# Patient Record
Sex: FEMALE | Race: WHITE | HISPANIC OR LATINO | Employment: PART TIME | URBAN - METROPOLITAN AREA
[De-identification: names, ages, dates, MRNs, and addresses within clinical notes are randomized per-mention and may not be internally consistent; named-entity substitution may affect disease eponyms.]

---

## 2023-04-14 PROBLEM — B96.89 ACUTE BACTERIAL SINUSITIS: Status: ACTIVE | Noted: 2023-04-14

## 2023-04-14 PROBLEM — J01.90 ACUTE BACTERIAL SINUSITIS: Status: ACTIVE | Noted: 2023-04-14

## 2023-04-14 PROBLEM — Z13.31 POSITIVE SCREENING FOR DEPRESSION ON 9-ITEM PATIENT HEALTH QUESTIONNAIRE (PHQ-9): Status: ACTIVE | Noted: 2023-04-14

## 2023-05-03 ENCOUNTER — OFFICE VISIT (OUTPATIENT)
Dept: FAMILY MEDICINE CLINIC | Facility: CLINIC | Age: 48
End: 2023-05-03

## 2023-05-03 VITALS
SYSTOLIC BLOOD PRESSURE: 128 MMHG | OXYGEN SATURATION: 96 % | RESPIRATION RATE: 18 BRPM | TEMPERATURE: 98 F | DIASTOLIC BLOOD PRESSURE: 84 MMHG | WEIGHT: 170 LBS | HEART RATE: 74 BPM | BODY MASS INDEX: 29.02 KG/M2 | HEIGHT: 64 IN

## 2023-05-03 DIAGNOSIS — Z12.11 COLON CANCER SCREENING: ICD-10-CM

## 2023-05-03 DIAGNOSIS — M94.0 COSTOCHONDRITIS: ICD-10-CM

## 2023-05-03 DIAGNOSIS — Z12.4 CERVICAL CANCER SCREENING: ICD-10-CM

## 2023-05-03 DIAGNOSIS — E66.3 OVERWEIGHT (BMI 25.0-29.9): ICD-10-CM

## 2023-05-03 DIAGNOSIS — J32.1 CHRONIC FRONTAL SINUSITIS: ICD-10-CM

## 2023-05-03 DIAGNOSIS — R00.2 PALPITATIONS: ICD-10-CM

## 2023-05-03 DIAGNOSIS — Z12.31 ENCOUNTER FOR SCREENING MAMMOGRAM FOR MALIGNANT NEOPLASM OF BREAST: ICD-10-CM

## 2023-05-03 DIAGNOSIS — R10.2 PELVIC PAIN: ICD-10-CM

## 2023-05-03 DIAGNOSIS — Z23 ENCOUNTER FOR IMMUNIZATION: ICD-10-CM

## 2023-05-03 DIAGNOSIS — Z11.3 SCREENING EXAMINATION FOR STD (SEXUALLY TRANSMITTED DISEASE): ICD-10-CM

## 2023-05-03 DIAGNOSIS — E55.9 VITAMIN D DEFICIENCY: ICD-10-CM

## 2023-05-03 DIAGNOSIS — F99 MENTAL HEALTH DISORDER: Primary | ICD-10-CM

## 2023-05-03 PROBLEM — J32.9 CHRONIC SINUSITIS: Status: ACTIVE | Noted: 2023-04-14

## 2023-05-03 PROBLEM — Z13.31 POSITIVE SCREENING FOR DEPRESSION ON 9-ITEM PATIENT HEALTH QUESTIONNAIRE (PHQ-9): Status: RESOLVED | Noted: 2023-04-14 | Resolved: 2023-05-03

## 2023-05-03 NOTE — ASSESSMENT & PLAN NOTE
BMI Counseling: Body mass index is 29 18 kg/m²  The BMI is above normal  Nutrition recommendations include reducing portion sizes, decreasing overall calorie intake, 3-5 servings of fruits/vegetables daily, reducing fast food intake, consuming healthier snacks, decreasing soda and/or juice intake, moderation in carbohydrate intake, increasing intake of lean protein, reducing intake of saturated fat and trans fat and reducing intake of cholesterol  Exercise recommendations include moderate aerobic physical activity for 150 minutes/week

## 2023-05-03 NOTE — ASSESSMENT & PLAN NOTE
- Recommend massage, stretches, ice/heat application    - May use lidocaine cream prn  - Advised pt that it may take a few weeks to notice improvement

## 2023-05-03 NOTE — ASSESSMENT & PLAN NOTE
- Will check UA & screen for STDs  Recommend f/u with gynecology  Consider transvaginal US if normal labs   Tylenol PRN for pain

## 2023-05-03 NOTE — PROGRESS NOTES
Name: Иирна Bland      : 1975      MRN: [id number]  Encounter Provider: CARMEN Mares  Encounter Date: 5/3/2023   Encounter department: [facility]    Assessment & Plan     1  Mental health disorder  Assessment & Plan:  - I advised the patient to sign a medical release form and to bring previous records to the office  She does have some records at home  F/u with psychiatrist as scheduled  - Reviewed lifestyle management such as exercise, meditation, drinking 64 oz of water daily, and following a healthy diet  2  Overweight (BMI 25 0-29  9)  Assessment & Plan:  BMI Counseling: Body mass index is 29 18 kg/m²  The BMI is above normal  Nutrition recommendations include reducing portion sizes, decreasing overall calorie intake, 3-5 servings of fruits/vegetables daily, reducing fast food intake, consuming healthier snacks, decreasing soda and/or juice intake, moderation in carbohydrate intake, increasing intake of lean protein, reducing intake of saturated fat and trans fat and reducing intake of cholesterol  Exercise recommendations include moderate aerobic physical activity for 150 minutes/week  Orders:  -     Hemoglobin A1C; Future  -     Basic metabolic panel; Future  -     Lipid Panel with Direct LDL reflex; Future  -     : HIV 1/2 AB/AG w Reflex [facility] for 2 yr old and above; Future    3  Encounter for immunization  -     TDAP VACCINE GREATER THAN OR EQUAL TO 6YO IM    4  Encounter for screening mammogram for malignant neoplasm of breast  -     Mammo screening bilateral w 3d & cad; Future; Expected date: 2023    5  Colon cancer screening  -     Ambulatory referral for colonoscopy; Future    6  Cervical cancer screening  -     Ambulatory Referral to Obstetrics / Gynecology; Future    7  Palpitations  Assessment & Plan:  EKG showed NSR in office  Will check labs  Likely 2/2 to anxiety      Orders:  -     POCT ECG  -     CBC and differential; "Future    8  Costochondritis  Assessment & Plan:  - Recommend massage, stretches, ice/heat application    - May use lidocaine cream prn  - Advised pt that it may take a few weeks to notice improvement  Orders:  -     POCT ECG    9  Chronic frontal sinusitis  Assessment & Plan:  - Advised patient to buy otc flonase  Reviewed proper administration technique  10  Screening examination for STD (sexually transmitted disease)  -     Trichomonas Vaginalis, EMILY; Future  -     Chlamydia/GC amplified DNA by PCR; Future  -     RPR-Syphilis Screening (Total Syphilis IGG/IGM); Future  -     Hepatitis panel, acute; Future    11  Vitamin D deficiency  -     Vitamin D 25 hydroxy; Future    12  Pelvic pain  Assessment & Plan:  - Will check UA & screen for STDs  Recommend f/u with gynecology  Consider transvaginal US if normal labs  Tylenol PRN for pain    Orders:  -     UA (URINE) with reflex to Scope         Subjective      Lawrence Poag is a 57 y o  female  has no past medical history on file  has no past surgical history on file  She presents today to establish care  She reports a history of multiple mental health conditions  She is unsure of exact diagnosis  However, she does know that she has severe anxiety  She reports episodes of panic attacks in which she feels extremely SOB with accompanying palpitations  She was previously receiving mental health care at [address] [place] [address] but she missed too many appointments and stopped getting her medications refilled  She reports that she was previously taking 4 different medications but she does not know their names  She has an appt with her psychiatrist this week and does not plan to miss it  As per patient, \"I know my anxiety is out of control\"  She was seen here about 2 weeks ago for a same-day and diagnosed with ABRs and prescribed anitibiotics which she finished   She reports that she feels better since completing abxs but continues to have congestion and " "rhinorrhea  She never received the prescribed flonase  Patient endorses chronic pelvic pain  Onset of symptoms was several months ago and has been worsening since that time  The pain occurs all throughout the month  It is located in the suprapubic and is constant  She has not seen a provider for this  Denies  symptoms as per ROS  She has no history of PID, STD's  She does not desire further childbearing  Review of Systems   Constitutional: Negative for chills, fatigue and fever  HENT: Positive for congestion, postnasal drip and rhinorrhea  Negative for ear pain and sore throat  Eyes: Negative for pain and visual disturbance  Respiratory: Positive for shortness of breath  Negative for cough  Cardiovascular: Positive for chest pain and palpitations  Gastrointestinal: Negative for abdominal pain and vomiting  Genitourinary: Positive for pelvic pain  Negative for dysuria and hematuria  Musculoskeletal: Negative for arthralgias and back pain  Skin: Negative for color change and rash  Neurological: Negative for seizures and syncope  All other systems reviewed and are negative  Current Outpatient Medications on File Prior to Visit   Medication Sig    fluticasone (FLONASE) 50 mcg/act nasal spray 1 spray into each nostril daily       Objective     /84 (BP Location: Left arm, Patient Position: Sitting, Cuff Size: Standard)   Pulse 74   Temp 98 °F (36 7 °C) (Temporal)   Resp 18   Ht 5' 4\" (1 626 m)   Wt 77 1 kg (170 lb)   LMP 04/25/2023 (Approximate)   SpO2 96%   BMI 29 18 kg/m²     Physical Exam  Vitals and nursing note reviewed  HENT:      Head: Normocephalic and atraumatic  Right Ear: External ear normal       Left Ear: External ear normal       Nose: Nose normal    Eyes:      Extraocular Movements: Extraocular movements intact  Conjunctiva/sclera: Conjunctivae normal       Pupils: Pupils are equal, round, and reactive to light     Cardiovascular:      Rate " and Rhythm: Normal rate and regular rhythm  Pulses: Normal pulses  Heart sounds: Normal heart sounds  Pulmonary:      Effort: Pulmonary effort is normal       Breath sounds: Normal breath sounds  Chest:      Comments: Pain reproducible with palpitation, arm flexion maneuver and crowing rooster maneuver  Abdominal:      General: Bowel sounds are normal       Palpations: Abdomen is soft  Tenderness: There is abdominal tenderness in the suprapubic area  Musculoskeletal:         General: Normal range of motion  Cervical back: Normal range of motion  Skin:     General: Skin is warm and dry  Neurological:      General: No focal deficit present  Mental Status: She is alert and oriented to person, place, and time  Mental status is at baseline  Psychiatric:         Attention and Perception: She is inattentive  Mood and Affect: Mood is anxious and elated  Affect is tearful and inappropriate  Speech: Speech normal          Behavior: Behavior normal  Behavior is cooperative  Thought Content: Thought content normal  Thought content does not include homicidal or suicidal ideation  Thought content does not include homicidal or suicidal plan         Hossein Loera

## 2023-05-03 NOTE — ASSESSMENT & PLAN NOTE
- I advised the patient to sign a medical release form and to bring previous records to the office  She does have some records at home  F/u with psychiatrist as scheduled  - Reviewed lifestyle management such as exercise, meditation, drinking 64 oz of water daily, and following a healthy diet

## 2023-05-18 ENCOUNTER — OFFICE VISIT (OUTPATIENT)
Dept: OBGYN CLINIC | Facility: CLINIC | Age: 48
End: 2023-05-18

## 2023-05-18 ENCOUNTER — APPOINTMENT (OUTPATIENT)
Dept: LAB | Facility: CLINIC | Age: 48
End: 2023-05-18

## 2023-05-18 VITALS
DIASTOLIC BLOOD PRESSURE: 81 MMHG | SYSTOLIC BLOOD PRESSURE: 162 MMHG | HEART RATE: 77 BPM | WEIGHT: 172 LBS | BODY MASS INDEX: 29.37 KG/M2 | HEIGHT: 64 IN

## 2023-05-18 DIAGNOSIS — E66.3 OVERWEIGHT (BMI 25.0-29.9): ICD-10-CM

## 2023-05-18 DIAGNOSIS — Z11.3 SCREENING EXAMINATION FOR STD (SEXUALLY TRANSMITTED DISEASE): ICD-10-CM

## 2023-05-18 DIAGNOSIS — E55.9 VITAMIN D DEFICIENCY: ICD-10-CM

## 2023-05-18 DIAGNOSIS — Z12.4 CERVICAL CANCER SCREENING: Primary | ICD-10-CM

## 2023-05-18 DIAGNOSIS — R00.2 PALPITATIONS: ICD-10-CM

## 2023-05-18 LAB
25(OH)D3 SERPL-MCNC: 18.9 NG/ML (ref 30–100)
ANION GAP SERPL CALCULATED.3IONS-SCNC: 1 MMOL/L (ref 4–13)
BASOPHILS # BLD AUTO: 0.04 THOUSANDS/ÂΜL (ref 0–0.1)
BASOPHILS NFR BLD AUTO: 1 % (ref 0–1)
BILIRUB UR QL STRIP: NEGATIVE
BUN SERPL-MCNC: 11 MG/DL (ref 5–25)
CALCIUM SERPL-MCNC: 8.9 MG/DL (ref 8.3–10.1)
CHLORIDE SERPL-SCNC: 112 MMOL/L (ref 96–108)
CHOLEST SERPL-MCNC: 249 MG/DL
CLARITY UR: CLEAR
CO2 SERPL-SCNC: 25 MMOL/L (ref 21–32)
COLOR UR: NORMAL
CREAT SERPL-MCNC: 0.68 MG/DL (ref 0.6–1.3)
EOSINOPHIL # BLD AUTO: 0.19 THOUSAND/ÂΜL (ref 0–0.61)
EOSINOPHIL NFR BLD AUTO: 3 % (ref 0–6)
ERYTHROCYTE [DISTWIDTH] IN BLOOD BY AUTOMATED COUNT: 14.5 % (ref 11.6–15.1)
GFR SERPL CREATININE-BSD FRML MDRD: 105 ML/MIN/1.73SQ M
GLUCOSE P FAST SERPL-MCNC: 114 MG/DL (ref 65–99)
GLUCOSE UR STRIP-MCNC: NEGATIVE MG/DL
HAV IGM SER QL: NORMAL
HBV CORE IGM SER QL: NORMAL
HBV SURFACE AG SER QL: NORMAL
HCT VFR BLD AUTO: 40.5 % (ref 34.8–46.1)
HCV AB SER QL: NORMAL
HDLC SERPL-MCNC: 36 MG/DL
HGB BLD-MCNC: 13.2 G/DL (ref 11.5–15.4)
HGB UR QL STRIP.AUTO: NEGATIVE
HIV 1+2 AB+HIV1 P24 AG SERPL QL IA: NORMAL
HIV 2 AB SERPL QL IA: NORMAL
HIV1 AB SERPL QL IA: NORMAL
HIV1 P24 AG SERPL QL IA: NORMAL
IMM GRANULOCYTES # BLD AUTO: 0.03 THOUSAND/UL (ref 0–0.2)
IMM GRANULOCYTES NFR BLD AUTO: 0 % (ref 0–2)
KETONES UR STRIP-MCNC: NEGATIVE MG/DL
LDLC SERPL CALC-MCNC: 176 MG/DL (ref 0–100)
LEUKOCYTE ESTERASE UR QL STRIP: NEGATIVE
LYMPHOCYTES # BLD AUTO: 2.3 THOUSANDS/ÂΜL (ref 0.6–4.47)
LYMPHOCYTES NFR BLD AUTO: 31 % (ref 14–44)
MCH RBC QN AUTO: 28.6 PG (ref 26.8–34.3)
MCHC RBC AUTO-ENTMCNC: 32.6 G/DL (ref 31.4–37.4)
MCV RBC AUTO: 88 FL (ref 82–98)
MONOCYTES # BLD AUTO: 0.43 THOUSAND/ÂΜL (ref 0.17–1.22)
MONOCYTES NFR BLD AUTO: 6 % (ref 4–12)
NEUTROPHILS # BLD AUTO: 4.41 THOUSANDS/ÂΜL (ref 1.85–7.62)
NEUTS SEG NFR BLD AUTO: 59 % (ref 43–75)
NITRITE UR QL STRIP: NEGATIVE
NRBC BLD AUTO-RTO: 0 /100 WBCS
PH UR STRIP.AUTO: 7 [PH]
PLATELET # BLD AUTO: 204 THOUSANDS/UL (ref 149–390)
PMV BLD AUTO: 13.4 FL (ref 8.9–12.7)
POTASSIUM SERPL-SCNC: 4.2 MMOL/L (ref 3.5–5.3)
PROT UR STRIP-MCNC: NEGATIVE MG/DL
RBC # BLD AUTO: 4.61 MILLION/UL (ref 3.81–5.12)
SODIUM SERPL-SCNC: 138 MMOL/L (ref 135–147)
SP GR UR STRIP.AUTO: 1.02 (ref 1–1.03)
TREPONEMA PALLIDUM IGG+IGM AB [PRESENCE] IN SERUM OR PLASMA BY IMMUNOASSAY: NORMAL
TRIGL SERPL-MCNC: 186 MG/DL
UROBILINOGEN UR STRIP-ACNC: <2 MG/DL
WBC # BLD AUTO: 7.4 THOUSAND/UL (ref 4.31–10.16)

## 2023-05-18 NOTE — PROGRESS NOTES
"OB/GYN  ANNUAL VISIT  Selene Dallas  5/18/2023  9:42 AM  Dr Julian Reyes MD     Subjective      Selene Dallas is a 57 y o  female with history of smoking anxiety, depression ans smoking who presents for annual well woman exam  Periods are regular every 28-30 days, lasting 5 days  No intermenstrual bleeding, spotting, or discharge  GYN:  · Denies vaginal discharge, labial erythema or lesions, dyspareunia  · Menarche at 10-11  · Menses are regular, q 28 days, lasting 5 days  · Contraception: BTL  · Patient is  sexually active with one male partner  · Last pap smear was in 2012  Patient states she was informed she had a type 2 abnormality, patient states she required some \"cleaning procedure\" and was informed she need a follow up 5 years after  · Gynecologic surgeries  2 C/S BTL    OB:  · [id number] female  · Pregnancies were complicated byt PTB in her last delivery at 36 weeks, baby required NICU admission    :  · Patient states frequency, urinary tenesmus and dysuria for long time  · Denies hematuria, flank pain, incontinence  Breast:  · Denies breast mass, skin changes, dimpling, reddening, nipple retraction  · Denies breast discharge  · Patient never had a mammogram done so far  · Patient does  have a family history of cervical cancer in her 3 aunts, around 46s      General:  · Diet: no  · Exercise: no  · Work: yes  · ETOH use: no  · Tobacco use: cigarette, 20 per day  · Recreational drug use: no    Screening:  · Cervical cancer: Last pap smear was in 2012  Patient states she was informed she had a type 2 abnormality, patient states she required some \"cleaning procedure\" and was informed she need a follow up 5 years after  · Breast cancer: never performed so far  · STD screening:  Denies STD history     Review of Systems  Pertinent items are noted in HPI        Objective      /81   Pulse 77   Ht 5' 4\" (1 626 m)   Wt 78 kg (172 lb)   LMP 04/25/2023 (Approximate)   BMI 29 52 kg/m² " "    Physical Exam  Cardio-pulmonar  Normal vesicular murmur, no rales,  nonlabored breathing , normal S1/S2 no cardiac murmurs , no gallops   Abdomen  Soft , no tender, no signs or peritoneal irritation   Extremities  mobiles no edemas    Pelvic examination  Normal appearing external genitals, no skin lesions in vulvar area, anteverted  Uterus  No pain with mobilization  No evidence of vaginal discharge or bleeding  No masses  Breast Examination  No breast masses, no nipple discharge or retraction, no skin changes, no axillar adenopathies  Assessment/Plan     Katyfrederic Duran is a 57 y o  female who presents for annual well woman exam  - patient had a recent collection for HIV, Syphilis, Hep B with pending resulrs  - Last pap smear was in 2012  Patient states she was informed she had a type 2 abnormality, patient states she required some \"cleaning procedure\" and was informed she need a follow up 5 years after  New specimen collected today  - Anticonceptive method: BTL  - Breast examination WNL  , Mammogram ordered, patient states she will be scheduling soon  - Reviewed topics of safe sex practices, depression, and domestic violence  - Reviewed screening guidelines for pap smears  Pap cytology are every 3 years and Pap/HPV cotesting is due very 5 years  - Emphasized importance of annual pelvic and breast exams  Family history reviewed regarding genetically inherited cancers  - All questions have been answered to her satisfaction       Patient D/ with Dr Azael Jaramillo MD            "

## 2023-05-19 LAB
EST. AVERAGE GLUCOSE BLD GHB EST-MCNC: 120 MG/DL
HBA1C MFR BLD: 5.8 %

## 2023-05-24 ENCOUNTER — TELEPHONE (OUTPATIENT)
Dept: FAMILY MEDICINE CLINIC | Facility: CLINIC | Age: 48
End: 2023-05-24

## 2023-05-31 LAB
LAB AP GYN PRIMARY INTERPRETATION: NORMAL
Lab: NORMAL
PATH INTERP SPEC-IMP: NORMAL

## 2023-06-12 NOTE — PROGRESS NOTES
[address] [facility]    NAME: Umberto Rodriguez  AGE: 57 y o  SEX: female  : 1975     DATE: 2023     Assessment and Plan:     Problem List Items Addressed This Visit        Other    Mental health disorder    Relevant Medications    hydrOXYzine pamoate (VISTARIL) 25 mg capsule    Other Relevant Orders    Ambulatory Referral to Psychiatry    Anxiety     - Reviewed lifestyle management such as exercise, meditation, drinking 64 oz of water daily, and following a healthy diet  - Start vistaril to use prn  Pt reports that she missed her psychiatric appt and needs to reschedule it  Will start preventative medication at next OV, if she is not able to establish with a mental health specialist by that time  Relevant Medications    hydrOXYzine pamoate (VISTARIL) 25 mg capsule    Other Relevant Orders    Ambulatory Referral to Psychiatry    Prediabetes     - Reviewed lifestyle management to prevent diabetes  Mixed hyperlipidemia     - Dietary counseling provided  Vitamin D deficiency     - Encouraged a vitamin D rich diet  Relevant Medications    cholecalciferol (VITAMIN D3) 1,000 units tablet   Other Visit Diagnoses     Annual physical exam    -  Primary    Dermatitis        Relevant Medications    hydrocortisone 1 % cream          Immunizations and preventive care screenings were discussed with patient today  Appropriate education was printed on patient's after visit summary  Counseling:  Alcohol/drug use: discussed moderation in alcohol intake, the recommendations for healthy alcohol use, and avoidance of illicit drug use  Dental Health: discussed importance of regular tooth brushing, flossing, and dental visits  Injury prevention: discussed safety/seat belts, safety helmets, smoke detectors, carbon dioxide detectors, and smoking near bedding or upholstery    Sexual health: discussed sexually transmitted diseases, partner selection, use of condoms, avoidance of unintended pregnancy, and contraceptive alternatives  Exercise: the importance of regular exercise/physical activity was discussed  Recommend exercise 3-5 times per week for at least 30 minutes  Depression Screening and Follow-up Plan: Patient was screened for depression during today's encounter  They screened negative with a PHQ-2 score of 2  Return in about 1 month (around 7/13/2023) for Recheck ANXITY  Chief Complaint:     Chief Complaint   Patient presents with   • Follow-up      History of Present Illness:     Adult Annual Physical   Patient here for a comprehensive physical exam      Diet and Physical Activity  · Diet/Nutrition: poor diet  · Exercise: no formal exercise  Depression Screening  PHQ-2/9 Depression Screening    Little interest or pleasure in doing things: 1 - several days  Feeling down, depressed, or hopeless: 1 - several days  PHQ-2 Score: 2  PHQ-2 Interpretation: Negative depression screen       General Health  · Sleep: sleeps poorly, gets 4-6 hours of sleep on average and unrefreshing sleep  · Hearing: normal - bilateral   · Vision: vision problems: CHRONICBLURRY VISION and most recent eye exam >1 year ago  · Dental: no dental visits for >1 year, brushes teeth three times daily and does not floss  /GYN Health  · Patient is: premenopausal  · Last menstrual period: 6/11/23  · Contraceptive method: TUBALI LIGATION  Review of Systems:     Review of Systems   Constitutional: Negative for chills and fever  HENT: Positive for congestion  Negative for ear pain and sore throat  Eyes: Negative for pain and visual disturbance  Respiratory: Negative for cough and shortness of breath  Cardiovascular: Negative for chest pain and palpitations  Gastrointestinal: Negative for abdominal pain and vomiting  Genitourinary: Negative for dysuria and hematuria     Musculoskeletal: Negative for arthralgias and back pain  Skin: Negative for color change and rash  Neurological: Negative for seizures and syncope  Psychiatric/Behavioral: The patient is nervous/anxious  All other systems reviewed and are negative  Past Medical History:     History reviewed  No pertinent past medical history  Past Surgical History:     History reviewed  No pertinent surgical history  Social History:     Social History     Socioeconomic History   • Marital status: Single     Spouse name: None   • Number of children: None   • Years of education: None   • Highest education level: None   Occupational History   • None   Tobacco Use   • Smoking status: Former     Packs/day: 1 00     Types: Cigarettes     Passive exposure: Past   • Smokeless tobacco: Never   Substance and Sexual Activity   • Alcohol use: Never   • Drug use: Never   • Sexual activity: Yes   Other Topics Concern   • None   Social History Narrative   • None     Social Determinants of Health     Financial Resource Strain: Medium Risk (4/14/2023)    Overall Financial Resource Strain (CARDIA)    • Difficulty of Paying Living Expenses: Somewhat hard   Food Insecurity: No Food Insecurity (4/14/2023)    Hunger Vital Sign    • Worried About Running Out of Food in the Last Year: Never true    • Ran Out of Food in the Last Year: Never true   Transportation Needs: No Transportation Needs (4/14/2023)    PRAPARE - Transportation    • Lack of Transportation (Medical): No    • Lack of Transportation (Non-Medical): No   Physical Activity: Not on file   Stress: Not on file   Social Connections: Not on file   Intimate Partner Violence: Not on file   Housing Stability: Not on file      Family History:     History reviewed  No pertinent family history     Current Medications:     Current Outpatient Medications   Medication Sig Dispense Refill   • cholecalciferol (VITAMIN D3) 1,000 units tablet Take 1 tablet (1,000 Units total) by mouth daily 90 tablet 0   • "hydrocortisone 1 % cream Apply topically 4 (four) times a day as needed for irritation 30 g 0   • hydrOXYzine pamoate (VISTARIL) 25 mg capsule Take 1 capsule (25 mg total) by mouth 3 (three) times a day as needed for itching 30 capsule 0   • fluticasone (FLONASE) 50 mcg/act nasal spray 1 spray into each nostril daily (Patient not taking: Reported on 5/18/2023) 16 g 0     No current facility-administered medications for this visit  Allergies:     No Known Allergies   Physical Exam:     /80 (BP Location: Right arm, Patient Position: Sitting, Cuff Size: Standard)   Pulse (!) 106   Temp 97 8 °F (36 6 °C) (Temporal)   Resp 16   Ht 5' 4\" (1 626 m)   Wt 76 7 kg (169 lb)   LMP 06/12/2023 (Approximate)   SpO2 97%   Breastfeeding No   BMI 29 01 kg/m²     Physical Exam  Vitals and nursing note reviewed  Constitutional:       General: She is not in acute distress  Appearance: Normal appearance  She is well-developed and overweight  HENT:      Head: Normocephalic and atraumatic  Right Ear: External ear normal       Left Ear: External ear normal       Nose: Congestion present  Mouth/Throat:      Mouth: Mucous membranes are moist       Pharynx: Oropharynx is clear  Eyes:      Extraocular Movements: Extraocular movements intact  Conjunctiva/sclera: Conjunctivae normal       Pupils: Pupils are equal, round, and reactive to light  Cardiovascular:      Rate and Rhythm: Normal rate and regular rhythm  Pulses: Normal pulses  Heart sounds: Normal heart sounds  No murmur heard  Pulmonary:      Effort: Pulmonary effort is normal  No respiratory distress  Breath sounds: Normal breath sounds  Abdominal:      Palpations: Abdomen is soft  Tenderness: There is no abdominal tenderness  Musculoskeletal:         General: Normal range of motion  Cervical back: Normal range of motion and neck supple  Skin:     General: Skin is warm and dry     Neurological:      Mental " Status: She is alert and oriented to person, place, and time  Mental status is at baseline  Psychiatric:         Mood and Affect: Mood normal          Behavior: Behavior normal          Thought Content:  Thought content normal          Judgment: Judgment normal           Maria M Castaneda, [address]

## 2023-06-13 ENCOUNTER — OFFICE VISIT (OUTPATIENT)
Dept: FAMILY MEDICINE CLINIC | Facility: CLINIC | Age: 48
End: 2023-06-13

## 2023-06-13 VITALS
RESPIRATION RATE: 16 BRPM | WEIGHT: 169 LBS | TEMPERATURE: 97.8 F | SYSTOLIC BLOOD PRESSURE: 110 MMHG | BODY MASS INDEX: 28.85 KG/M2 | OXYGEN SATURATION: 97 % | HEIGHT: 64 IN | DIASTOLIC BLOOD PRESSURE: 80 MMHG | HEART RATE: 106 BPM

## 2023-06-13 DIAGNOSIS — F41.9 ANXIETY: ICD-10-CM

## 2023-06-13 DIAGNOSIS — E55.9 VITAMIN D DEFICIENCY: ICD-10-CM

## 2023-06-13 DIAGNOSIS — R73.03 PREDIABETES: ICD-10-CM

## 2023-06-13 DIAGNOSIS — E78.2 MIXED HYPERLIPIDEMIA: ICD-10-CM

## 2023-06-13 DIAGNOSIS — Z00.00 ANNUAL PHYSICAL EXAM: Primary | ICD-10-CM

## 2023-06-13 DIAGNOSIS — L30.9 DERMATITIS: ICD-10-CM

## 2023-06-13 DIAGNOSIS — F99 MENTAL HEALTH DISORDER: ICD-10-CM

## 2023-06-13 PROCEDURE — 99396 PREV VISIT EST AGE 40-64: CPT

## 2023-06-13 PROCEDURE — 99214 OFFICE O/P EST MOD 30 MIN: CPT

## 2023-06-13 RX ORDER — HYDROXYZINE PAMOATE 25 MG/1
25 CAPSULE ORAL 3 TIMES DAILY PRN
Qty: 30 CAPSULE | Refills: 0 | Status: SHIPPED | OUTPATIENT
Start: 2023-06-13

## 2023-06-13 RX ORDER — MELATONIN
1000 DAILY
Qty: 90 TABLET | Refills: 0 | Status: SHIPPED | OUTPATIENT
Start: 2023-06-13

## 2023-06-13 RX ORDER — DIAPER,BRIEF,INFANT-TODD,DISP
EACH MISCELLANEOUS 4 TIMES DAILY PRN
Qty: 30 G | Refills: 0 | Status: SHIPPED | OUTPATIENT
Start: 2023-06-13

## 2023-06-13 NOTE — ASSESSMENT & PLAN NOTE
- Reviewed lifestyle management such as exercise, meditation, drinking 64 oz of water daily, and following a healthy diet  - Start vistaril to use prn  Pt reports that she missed her psychiatric appt and needs to reschedule it  Will start preventative medication at next OV, if she is not able to establish with a mental health specialist by that time

## 2023-06-13 NOTE — PATIENT INSTRUCTIONS
[phone] [facility] Visit for Adults   AMBULATORY CARE:   A wellness visit  is when you see your healthcare provider to get screened for health problems  Your healthcare provider will also give you advice on how to stay healthy  Write down your questions so you remember to ask them  Ask your healthcare provider how often you should have a wellness visit  What happens at a wellness visit:  Your healthcare provider will ask about your health, and your family history of health problems  This includes high blood pressure, heart disease, and cancer  He or she will ask if you have symptoms that concern you, if you smoke, and about your mood  You may also be asked about your intake of medicines, supplements, food, and alcohol  Any of the following may be done: Your weight  will be checked  Your height may also be checked so your body mass index (BMI) can be calculated  Your BMI shows if you are at a healthy weight  Your blood pressure  and heart rate will be checked  Your temperature may also be checked  Blood and urine tests  may be done  Blood tests may be done to check your cholesterol levels  Abnormal cholesterol levels increase your risk for heart disease and stroke  You may also need a blood or urine test to check for diabetes if you are at increased risk  Urine tests may be done to look for signs of an infection or kidney disease  A physical exam  includes checking your heartbeat and lungs with a stethoscope  Your healthcare provider may also check your skin to look for sun damage  Screening tests  may be recommended  A screening test is done to check for diseases that may not cause symptoms  The screening tests you may need depend on your age, gender, family history, and lifestyle habits  For example, colorectal screening may be recommended if you are 48years old or older      Screening tests you need if you are a woman:   A Pap smear  is used to screen for cervical cancer  Pap smears are usually done every 3 to 5 years depending on your age  You may need them more often if you have had abnormal Pap smear test results in the past  Ask your healthcare provider how often you should have a Pap smear  A mammogram  is an x-ray of your breasts to screen for breast cancer  Experts recommend mammograms every 2 years starting at age 48 years  You may need a mammogram at age 52 years or younger if you have an increased risk for breast cancer  Talk to your healthcare provider about when you should start having mammograms and how often you need them  Vaccines you may need:   Get an influenza vaccine  every year  The influenza vaccine protects you from the flu  Several types of viruses cause the flu  The viruses change over time, so new vaccines are made each year  Get a tetanus-diphtheria (Td) booster vaccine  every 10 years  This vaccine protects you against tetanus and diphtheria  Tetanus is a severe infection that may cause painful muscle spasms and lockjaw  Diphtheria is a severe bacterial infection that causes a thick covering in the back of your mouth and throat  Get a human papillomavirus (HPV) vaccine  if you are female and aged 23 to 32 or male 23 to 24 and never received it  This vaccine protects you from HPV infection  HPV is the most common infection spread by sexual contact  HPV may also cause vaginal, penile, and anal cancers  Get a pneumococcal vaccine  if you are aged 72 years or older  The pneumococcal vaccine is an injection given to protect you from pneumococcal disease  Pneumococcal disease is an infection caused by pneumococcal bacteria  The infection may cause pneumonia, meningitis, or an ear infection  Get a shingles vaccine  if you are 60 or older, even if you have had shingles before  The shingles vaccine is an injection to protect you from the varicella-zoster virus  This is the same virus that causes chickenpox   Shingles is a painful rash that develops in people who had chickenpox or have been exposed to the virus  How to eat healthy:  My Plate is a model for planning healthy meals  It shows the types and amounts of foods that should go on your plate  Fruits and vegetables make up about half of your plate, and grains and protein make up the other half  A serving of dairy is included on the side of your plate  The amount of calories and serving sizes you need depends on your age, gender, weight, and height  Examples of healthy foods are listed below:  Eat a variety of vegetables  such as dark green, red, and orange vegetables  You can also include canned vegetables low in sodium (salt) and frozen vegetables without added butter or sauces  Eat a variety of fresh fruits , canned fruit in 100% juice, frozen fruit, and dried fruit  Include whole grains  At least half of the grains you eat should be whole grains  Examples include whole-wheat bread, wheat pasta, brown rice, and whole-grain cereals such as oatmeal     Eat a variety of protein foods such as seafood (fish and shellfish), lean meat, and poultry without skin (turkey and chicken)  Examples of lean meats include pork leg, shoulder, or tenderloin, and beef round, sirloin, tenderloin, and extra lean ground beef  Other protein foods include eggs and egg substitutes, beans, peas, soy products, nuts, and seeds  Choose low-fat dairy products such as skim or 1% milk or low-fat yogurt, cheese, and cottage cheese  Limit unhealthy fats  such as butter, hard margarine, and shortening  Exercise:  Exercise at least 30 minutes per day on most days of the week  Some examples of exercise include walking, biking, dancing, and swimming  You can also fit in more physical activity by taking the stairs instead of the elevator or parking farther away from stores  Include muscle strengthening activities 2 days each week  Regular exercise provides many health benefits   It helps you manage your weight, and decreases your risk for type 2 diabetes, heart disease, stroke, and high blood pressure  Exercise can also help improve your mood  Ask your healthcare provider about the best exercise plan for you  General health and safety guidelines:   Do not smoke  Nicotine and other chemicals in cigarettes and cigars can cause lung damage  Ask your healthcare provider for information if you currently smoke and need help to quit  E-cigarettes or smokeless tobacco still contain nicotine  Talk to your healthcare provider before you use these products  Limit alcohol  A drink of alcohol is 12 ounces of beer, 5 ounces of wine, or 1½ ounces of liquor  Lose weight, if needed  Being overweight increases your risk of certain health conditions  These include heart disease, high blood pressure, type 2 diabetes, and certain types of cancer  Protect your skin  Do not sunbathe or use tanning beds  Use sunscreen with a SPF 15 or higher  Apply sunscreen at least 15 minutes before you go outside  Reapply sunscreen every 2 hours  Wear protective clothing, hats, and sunglasses when you are outside  Drive safely  Always wear your seatbelt  Make sure everyone in your car wears a seatbelt  A seatbelt can save your life if you are in an accident  Do not use your cell phone when you are driving  This could distract you and cause an accident  Pull over if you need to make a call or send a text message  Practice safe sex  Use latex condoms if are sexually active and have more than one partner  Your healthcare provider may recommend screening tests for sexually transmitted infections (STIs)  Wear helmets, lifejackets, and protective gear  Always wear a helmet when you ride a bike or motorcycle, go skiing, or play sports that could cause a head injury  Wear protective equipment when you play sports  Wear a lifejacket when you are on a boat or doing water sports      © Copyright [employer] 2022 Information is for End User's use only and may not be sold, redistributed or otherwise used for commercial purposes  The above information is an  only  It is not intended as medical advice for individual conditions or treatments  Talk to your doctor, nurse or pharmacist before following any medical regimen to see if it is safe and effective for you

## 2023-07-14 ENCOUNTER — TELEPHONE (OUTPATIENT)
Dept: PSYCHIATRY | Facility: CLINIC | Age: 48
End: 2023-07-14